# Patient Record
Sex: FEMALE | Race: WHITE | NOT HISPANIC OR LATINO | URBAN - METROPOLITAN AREA
[De-identification: names, ages, dates, MRNs, and addresses within clinical notes are randomized per-mention and may not be internally consistent; named-entity substitution may affect disease eponyms.]

---

## 2024-12-18 ENCOUNTER — APPOINTMENT (OUTPATIENT)
Dept: SURGERY | Facility: CLINIC | Age: 39
End: 2024-12-18

## 2024-12-23 ENCOUNTER — APPOINTMENT (OUTPATIENT)
Dept: SURGERY | Facility: CLINIC | Age: 39
End: 2024-12-23
Payer: COMMERCIAL

## 2024-12-23 VITALS
TEMPERATURE: 97.7 F | HEART RATE: 84 BPM | DIASTOLIC BLOOD PRESSURE: 82 MMHG | WEIGHT: 168 LBS | SYSTOLIC BLOOD PRESSURE: 126 MMHG | BODY MASS INDEX: 30.73 KG/M2 | OXYGEN SATURATION: 99 %

## 2024-12-23 DIAGNOSIS — R10.12 LEFT UPPER QUADRANT ABDOMINAL PAIN: Primary | ICD-10-CM

## 2024-12-23 RX ORDER — LANOLIN ALCOHOL/MO/W.PET/CERES
1 CREAM (GRAM) TOPICAL
COMMUNITY
Start: 2024-09-13

## 2024-12-23 RX ORDER — FERROUS SULFATE 325(65) MG
1 TABLET ORAL DAILY
COMMUNITY

## 2024-12-23 RX ORDER — NORETHINDRONE 0.35 MG/1
1 TABLET ORAL
COMMUNITY
Start: 2024-10-08

## 2024-12-23 RX ORDER — LOSARTAN POTASSIUM 50 MG/1
50 TABLET ORAL
COMMUNITY
Start: 2019-08-28

## 2024-12-23 RX ORDER — HYDROCHLOROTHIAZIDE 25 MG/1
25 TABLET ORAL
COMMUNITY
Start: 2024-12-04

## 2024-12-23 NOTE — PROGRESS NOTES
Subjective   Patient ID: Naomi Vargas is a 39 y.o. female who presents for Abdominal Pain (Left side ).  Also patient complaints and the heaviness in epigastric area, early satiety.  Patient had a previous history of severe gastritis.  Also patient had a history of endometriosis with abnormal MRI of the abdomen pelvis.    HPI described above last colonoscopy and EGD 2 years ago  Review of Systems GI consistent with abdominal discomfort, pain.  Review of all other 10 system is negative.  Physical Exam physical bilaterally, mucosa moist, bilateral breath sounds, regular rate, abdomen soft, tender in epigastric area, tender in left side of the abdomen.  No rebound, no guarding, no palpable hernias.  Palpable peripheral pulse, no focal neurological motor deficits.    Objective   Last colonoscopy within normal limits.  Last EGD showed severe gastritis.  Ulceration.  MRI from July 23 showed medialization of the bilateral ovaries which appear tethered to the posterior wall of the uterus and anterior rectosigmoid wall.  Compatible with a pelvic endometriosis.  Possibility of involvement of the small bowel loops into the endometriosis.  No diagnosis found.   There is no problem list on file for this patient.     No Known Allergies   Medication Documentation Review Audit       Reviewed by Dm Belle MD (Physician) on 12/23/24 at 1125      Medication Order Taking? Sig Documenting Provider Last Dose Status   cyanocobalamin (Vitamin B-12) 1,000 mcg tablet [id number] Yes Take 1 tablet (1,000 mcg) by mouth early in the morning.. Historical Provider, MD  Active   FeroSuL tablet [id number]  Take 1 tablet (325 mg) by mouth once daily. Historical Provider, MD  Active   hydroCHLOROthiazide (HYDRODiuril) 25 mg tablet [id number] Yes Take 1 tablet (25 mg) by mouth once daily. Historical Provider, MD  Active   Jencycla 0.35 mg tablet [id number] Yes Take 1 tablet (0.35 mg) by mouth early in the morning.. Historical Provider, MD  Active    losartan (Cozaar) 50 mg tablet [id number] Yes Take 1 tablet (50 mg) by mouth once daily. Historical Provider, MD  Active                    Past Medical History:   Diagnosis Date    Personal history of other diseases of the circulatory system     History of hypertension     Social History     Tobacco Use   Smoking Status Never   Smokeless Tobacco Never     No family history on file.   Past Surgical History:   Procedure Laterality Date    OTHER SURGICAL HISTORY  07/14/2022    Colonoscopy       Assessment/Plan   Patient with severe dyspepsia, history of gastritis with a peptic ulcers.  I recommend empiric treatment with the Prilosec 20 mg daily for 2 months.  Then follow-up in office.  Regarding endometriosis and potential involvement of the small bowel loops-I recommend to follow-up with a GYN.  And further management necessity of surgical intervention depends on the GYN decision.      Dm Belle MD